# Patient Record
Sex: FEMALE | Race: WHITE | NOT HISPANIC OR LATINO | Employment: OTHER | URBAN - METROPOLITAN AREA
[De-identification: names, ages, dates, MRNs, and addresses within clinical notes are randomized per-mention and may not be internally consistent; named-entity substitution may affect disease eponyms.]

---

## 2017-01-04 ENCOUNTER — HOSPITAL ENCOUNTER (OUTPATIENT)
Dept: LAB | Facility: MEDICAL CENTER | Age: 62
End: 2017-01-04
Attending: NURSE PRACTITIONER
Payer: COMMERCIAL

## 2017-01-04 DIAGNOSIS — I10 ESSENTIAL HYPERTENSION: ICD-10-CM

## 2017-01-04 DIAGNOSIS — E78.5 DYSLIPIDEMIA: ICD-10-CM

## 2017-01-04 DIAGNOSIS — Z00.00 ROUTINE HEALTH MAINTENANCE: ICD-10-CM

## 2017-01-04 LAB
25(OH)D3 SERPL-MCNC: 30 NG/ML (ref 30–100)
ALBUMIN SERPL BCP-MCNC: 4.6 G/DL (ref 3.2–4.9)
ALBUMIN/GLOB SERPL: 1.5 G/DL
ALP SERPL-CCNC: 62 U/L (ref 30–99)
ALT SERPL-CCNC: 141 U/L (ref 2–50)
ANION GAP SERPL CALC-SCNC: 10 MMOL/L (ref 0–11.9)
AST SERPL-CCNC: 153 U/L (ref 12–45)
BILIRUB SERPL-MCNC: 0.9 MG/DL (ref 0.1–1.5)
BUN SERPL-MCNC: 7 MG/DL (ref 8–22)
CALCIUM SERPL-MCNC: 10.1 MG/DL (ref 8.5–10.5)
CHLORIDE SERPL-SCNC: 92 MMOL/L (ref 96–112)
CHOLEST SERPL-MCNC: 345 MG/DL (ref 100–199)
CO2 SERPL-SCNC: 34 MMOL/L (ref 20–33)
CREAT SERPL-MCNC: 0.45 MG/DL (ref 0.5–1.4)
GLOBULIN SER CALC-MCNC: 3.1 G/DL (ref 1.9–3.5)
GLUCOSE SERPL-MCNC: 104 MG/DL (ref 65–99)
HDLC SERPL-MCNC: 85 MG/DL
LDLC SERPL CALC-MCNC: 242 MG/DL
POTASSIUM SERPL-SCNC: 3.5 MMOL/L (ref 3.6–5.5)
PROT SERPL-MCNC: 7.7 G/DL (ref 6–8.2)
SODIUM SERPL-SCNC: 136 MMOL/L (ref 135–145)
TRIGL SERPL-MCNC: 89 MG/DL (ref 0–149)

## 2017-01-04 PROCEDURE — 82306 VITAMIN D 25 HYDROXY: CPT

## 2017-01-04 PROCEDURE — 36415 COLL VENOUS BLD VENIPUNCTURE: CPT

## 2017-01-04 PROCEDURE — 80053 COMPREHEN METABOLIC PANEL: CPT

## 2017-01-04 PROCEDURE — 80061 LIPID PANEL: CPT

## 2017-01-10 NOTE — TELEPHONE ENCOUNTER
Was the patient seen in the last year in this department? Yes     Does patient have an active prescription for medications requested? No     Received Request Via: Pharmacy      Pt met protocol?: Yes, OV 12/29/16, LABS 1/17

## 2017-01-16 ENCOUNTER — OFFICE VISIT (OUTPATIENT)
Dept: MEDICAL GROUP | Facility: PHYSICIAN GROUP | Age: 62
End: 2017-01-16
Payer: COMMERCIAL

## 2017-01-16 VITALS
HEIGHT: 61 IN | TEMPERATURE: 98.2 F | OXYGEN SATURATION: 94 % | SYSTOLIC BLOOD PRESSURE: 136 MMHG | HEART RATE: 80 BPM | WEIGHT: 136 LBS | DIASTOLIC BLOOD PRESSURE: 84 MMHG | BODY MASS INDEX: 25.68 KG/M2

## 2017-01-16 DIAGNOSIS — I10 ESSENTIAL HYPERTENSION: ICD-10-CM

## 2017-01-16 DIAGNOSIS — R74.8 ELEVATED LIVER ENZYMES: ICD-10-CM

## 2017-01-16 DIAGNOSIS — R73.01 ELEVATED FASTING GLUCOSE: ICD-10-CM

## 2017-01-16 DIAGNOSIS — E87.6 HYPOKALEMIA: ICD-10-CM

## 2017-01-16 DIAGNOSIS — E78.5 DYSLIPIDEMIA: ICD-10-CM

## 2017-01-16 PROCEDURE — 99214 OFFICE O/P EST MOD 30 MIN: CPT | Performed by: NURSE PRACTITIONER

## 2017-01-16 NOTE — ASSESSMENT & PLAN NOTE
Recent FLP with LDL now 242 and total cholesterol 345. Her triglyceride level has decreased to 89. Ratio is 4.058. She is making lifestyle modifications and is not at all interested in starting medication at this time.

## 2017-01-16 NOTE — ASSESSMENT & PLAN NOTE
There has been in increase in her liver enzymes again. She has been limiting alcohol intake and plans to continue to do so.

## 2017-01-16 NOTE — PROGRESS NOTES
Subjective:     Chief Complaint   Patient presents with   • Hypertension   • Results     Lab result         Jeannine Jamison is a 61 y.o. female here today for evaluation and management of:    Mammogram 2.22 at [facility]    Essential hypertension  Managed with generic Corzide 40-5 mg qd,   Home readings typically below 120 systolic with both monitors, readings always lower but not the same on both devices.       Elevated liver enzymes  There has been in increase in her liver enzymes again. She has been limiting alcohol intake and plans to continue to do so.    Dyslipidemia  Recent FLP with LDL now 242 and total cholesterol 345. Her triglyceride level has decreased to 89. Ratio is 4.058. She is making lifestyle modifications and is not at all interested in starting medication at this time.      Hypokalemia  Managed with potassium chloride 10 mEq tablets twice a day. She does not always remember to take the second dose but is planning to start doing so regularly. Recent potassium level was a little low at 3.5.      Elevated fasting glucose  Father has diabetes diagnosed around age 80.  Recent fasting glucose [id number], level has been consistently elevated up to 116. Will get A1c reading with next labs.           ROS   Denies HA, chest pain, shortness of breath, abdominal pain, bladder or bowel changes, lower extremity edema.    Current medicines (including changes today)  Current Outpatient Prescriptions   Medication Sig Dispense Refill   • nadolol-bendroflumethiazide (CORZIDE) 40-5 MG per tablet TAKE 1 TABLET BY MOUTH ONCE DAILY 30 Tab 2   • potassium chloride SA (K-DUR) 10 MEQ Tab CR TAKE 1 TABLET BY MOUTH TWICE A DAY, MUST MAKE APPT FOR REFILLS 180 Tab 0   • Glucosamine-Chondroitin (OSTEO BI-FLEX REGULAR STRENGTH PO) Take  by mouth.     • Ascorbic Acid (VITAMIN C PO) Take  by mouth.     • Docusate Calcium (STOOL SOFTENER PO) Take  by mouth.     • VITAMIN E PO Take  by mouth.     • Calcium Citrate-Vitamin D (CITRACAL + D  "PO) Take  by mouth.     • montelukast (SINGULAIR) 10 MG TABS Take 10 mg by mouth every day.       No current facility-administered medications for this visit.       She  has a past medical history of Allergy; Hypertension; and Hyperlipidemia.    Allergies Pcn    Current medications, problem list, allergies, past medical history, and tobacco use history reviewed in [facility] today.    Health maintenance reviewed and updated.    Objective:   Blood pressure 136/84, pulse 80, temperature 36.8 °C (98.2 °F), height 1.549 m (5' 0.98\"), weight 61.689 kg (136 lb), SpO2 94 %. Body mass index is 25.71 kg/(m^2).     Physical Exam   Constitutional: Alert, no acute distress. Pleasant and cooperative with the examination.  Skin:   Warm, dry, no rashes in visible areas.    Eyes:   Pupils equal, round. Conjunctiva and sclera clear,    Lids normal.  ENT:  Pinna normal.   Neck:   Supple, trachea midline.  Lungs:  Normal effort and respirations. Clear to auscultation bilaterally.  CV:  Regular rate and rhythm.  MS/Ext:  Steady gait, no edema.  Psych:  Eye contact is good, affect calm.    Assessment and Plan:   The following treatment plan was discussed    1. Essential hypertension  Stable. Continue current medicines. Monitor labs regularly.        2. Elevated liver enzymes  Not well controlled. Counseled regarding lifestyle modifications. We'll continue to monitor.    3. Dyslipidemia  Uncontrolled. Declines medication use at this time. Counseled regarding some modifications of medication use including risks of elevated cholesterol. We'll continue to monitor. 4. Hypokalemia  Not well controlled. Reviewed ways to increase compliance with second daily dose. We'll continue to monitor.    5. Elevated fasting glucose  Improved, will obtain A1c at next labs.    Followup: Return in about 6 months (around 7/16/2017).  As scheduled, sooner if symptoms don't resolve or with any new problems.         Reviewed side effects, risks, and benefits of " medications prescribed today.  Advised to take all medications as instructed and report any side effects.   The patient voices understanding and agrees.  Report any new or worsening symptoms.  Have labs or other diagnostic studies prior to follow up.  Keep all appointments for any referrals given.      Please note this dictation was created using voice recognition software. Every reasonable attempt has been made to correct obvious errors, however there may be errors of grammar and possibly content that were not discovered before finalizing the note.

## 2017-01-16 NOTE — MR AVS SNAPSHOT
"        Jeannine Solerkelly   2017 7:55 AM   Office Visit   MRN: [id number]    Department:  [facility]   Dept Phone:  [phone]    Description:  Female : 1955   Provider:  JULIETA Woodward           Reason for Visit     Labs Only Lab result FV      Allergies as of 2017     Allergen Noted Reactions    Pcn [Penicillins] 2012   Hives      Vital Signs     Blood Pressure Pulse Temperature Height Weight Body Mass Index    136/84 mmHg 80 36.8 °C (98.2 °F) 1.549 m (5' 0.98\") 61.689 kg (136 lb) 25.71 kg/m2    Oxygen Saturation Smoking Status                94% Never Smoker           Basic Information     Date Of Birth Sex Race Ethnicity Preferred Language    1955 Female White Non- English      Your appointments     2017  8:30 AM   MA [id number] with [facility] MG 3   [facility] ([address])    [address]  [facility] [address] [phone]           No deodorant, powder, perfume or lotion under the arm or breast area.              Problem List              ICD-10-CM Priority Class Noted - Resolved    Essential hypertension I10   6/3/2014 - Present    Dyslipidemia E78.5   6/3/2014 - Present    Seasonal allergies J30.2   6/3/2014 - Present    History of basal cell carcinoma Z85.828   6/3/2014 - Present    Elevated liver enzymes R74.8   2014 - Present    Alcoholism /alcohol abuse (CMS-HCC) F10.20   2014 - Present    Routine health maintenance Z00.00   10/1/2015 - Present      Health Maintenance        Date Due Completion Dates    MAMMOGRAM 2015, 2014, 2014, 2014, 2013    IMM ZOSTER VACCINE 7/10/2016 ---    IMM INFLUENZA (1) 2016 ---    PAP SMEAR 2018, 2012, 2010, 2009, 5/10/2006    COLONOSCOPY 2019    IMM DTaP/Tdap/Td Vaccine (3 - Td) 2026, 2006            Current Immunizations     Tdap Vaccine 2016, 2006      Below " and/or attached are the medications your provider expects you to take. Review all of your home medications and newly ordered medications with your provider and/or pharmacist. Follow medication instructions as directed by your provider and/or pharmacist. Please keep your medication list with you and share with your provider. Update the information when medications are discontinued, doses are changed, or new medications (including over-the-counter products) are added; and carry medication information at all times in the event of emergency situations     Allergies:  PCN - Hives               Medications  Valid as of: January 16, 2017 -  8:45 AM    Generic Name Brand Name Tablet Size Instructions for use    Ascorbic Acid   Take  by mouth.        Calcium Citrate-Vitamin D   Take  by mouth.        Docusate Calcium   Take  by mouth.        Glucosamine-Chondroitin   Take  by mouth.        Montelukast Sodium (Tab) SINGULAIR 10 MG Take 10 mg by mouth every day.        Nadolol-Bendroflumethiazide (Tab) CORZIDE 40-5 MG TAKE 1 TABLET BY MOUTH ONCE DAILY        Potassium Chloride Inez CR (Tab CR) K-DUR 10 MEQ TAKE 1 TABLET BY MOUTH TWICE A DAY, MUST MAKE APPT FOR REFILLS        Vitamin E   Take  by mouth.        .                 Medicines prescribed today were sent to:     [id number] - [place] - [address]    [address] [place] [address]    Phone: [phone] Fax: [phone]    Open 24 Hours?: No    [id number] - [place] - [address] [facility] [address] [facility]    [address] [place] [address]    Phone: [phone] Fax: [phone]    Open 24 Hours?: No      Medication refill instructions:       If your prescription bottle indicates you have medication refills left, it is not necessary to call your provider’s office. Please contact your pharmacy and they will refill your medication.    If your prescription bottle indicates you do not have any refills left, you may request refills at  any time through one of the following ways: The online [employer] system (except Urgent Care), by calling your provider’s office, or by asking your pharmacy to contact your provider’s office with a refill request. Medication refills are processed only during regular business hours and may not be available until the next business day. Your provider may request additional information or to have a follow-up visit with you prior to refilling your medication.   *Please Note: Medication refills are assigned a new Rx number when refilled electronically. Your pharmacy may indicate that no refills were authorized even though a new prescription for the same medication is available at the pharmacy. Please request the medicine by name with the pharmacy before contacting your provider for a refill.           [employer] Access Code: Activation code not generated  Current [employer] Status: Active

## 2017-01-16 NOTE — ASSESSMENT & PLAN NOTE
Managed with generic Corzide 40-5 mg qd,   Home readings typically below 120 systolic with both monitors, readings always lower but not the same on both devices.

## 2017-01-16 NOTE — ASSESSMENT & PLAN NOTE
Managed with potassium chloride 10 mEq tablets twice a day. She does not always remember to take the second dose but is planning to start doing so regularly. Recent potassium level was a little low at 3.5.

## 2017-01-16 NOTE — ASSESSMENT & PLAN NOTE
Father has diabetes diagnosed around age 80.  Recent fasting glucose [id number], level has been consistently elevated up to 116. Will get A1c reading with next labs.

## 2017-02-22 ENCOUNTER — APPOINTMENT (OUTPATIENT)
Dept: RADIOLOGY | Facility: MEDICAL CENTER | Age: 62
End: 2017-02-22
Attending: NURSE PRACTITIONER
Payer: COMMERCIAL

## 2017-03-27 NOTE — TELEPHONE ENCOUNTER
Was the patient seen in the last year in this department? Yes     Does patient have an active prescription for medications requested? No     Received Request Via: Pharmacy      Pt met protocol?: Yes, OV 1/17   BP Readings from Last 1 Encounters:   01/16/17 136/84

## 2017-03-27 NOTE — TELEPHONE ENCOUNTER
Refill X 6 months, sent to pharmacy.Pt. Seen in the last 6 months per protocol.   Lab Results   Component Value Date/Time    SODIUM 136 01/04/2017 06:53 AM    POTASSIUM 3.5* 01/04/2017 06:53 AM    CHLORIDE 92* 01/04/2017 06:53 AM    CO2 34* 01/04/2017 06:53 AM    GLUCOSE 104* 01/04/2017 06:53 AM    BUN 7* 01/04/2017 06:53 AM    CREATININE 0.45* 01/04/2017 06:53 AM

## 2017-04-03 RX ORDER — POTASSIUM CHLORIDE 750 MG/1
10 TABLET, EXTENDED RELEASE ORAL 2 TIMES DAILY
Qty: 180 TAB | Refills: 0 | Status: SHIPPED | OUTPATIENT
Start: 2017-04-03

## 2017-04-03 NOTE — TELEPHONE ENCOUNTER
Was the patient seen in the last year in this department? Yes     Does patient have an active prescription for medications requested? No     Received Request Via: Pharmacy      Pt met protocol?: Yes, last ov 1/16/17, last labs 1/4/17

## 2017-04-04 ENCOUNTER — HOSPITAL ENCOUNTER (OUTPATIENT)
Dept: RADIOLOGY | Facility: MEDICAL CENTER | Age: 62
End: 2017-04-04
Attending: NURSE PRACTITIONER
Payer: COMMERCIAL

## 2017-04-04 PROCEDURE — G0202 SCR MAMMO BI INCL CAD: HCPCS

## 2017-09-12 NOTE — TELEPHONE ENCOUNTER
Pt last seen regarding this issue 1/17. Will send 3 months of fills to pharmacy. Patient is due for an appointment. Please schedule.

## 2017-09-12 NOTE — TELEPHONE ENCOUNTER
Pt states she moved to [facility] and will be establishing care with a new provider.

## 2021-06-16 NOTE — TELEPHONE ENCOUNTER
BP is elevated. Encourage low salt and low fat diet. Encourage regular exercise. Will continue to monitor and treat if BP remains high.      Pt last seen regarding this issue 1/17. Will send 3 months of fills to pharmacy.